# Patient Record
Sex: FEMALE | Employment: FULL TIME | URBAN - METROPOLITAN AREA
[De-identification: names, ages, dates, MRNs, and addresses within clinical notes are randomized per-mention and may not be internally consistent; named-entity substitution may affect disease eponyms.]

---

## 2024-08-10 ENCOUNTER — APPOINTMENT (OUTPATIENT)
Dept: GENERAL RADIOLOGY | Age: 19
End: 2024-08-10
Payer: MEDICAID

## 2024-08-10 ENCOUNTER — APPOINTMENT (OUTPATIENT)
Dept: ULTRASOUND IMAGING | Age: 19
End: 2024-08-10
Payer: MEDICAID

## 2024-08-10 ENCOUNTER — HOSPITAL ENCOUNTER (EMERGENCY)
Age: 19
Discharge: HOME | End: 2024-08-10
Attending: EMERGENCY MEDICINE
Payer: MEDICAID

## 2024-08-10 VITALS
BODY MASS INDEX: 21.66 KG/M2 | HEIGHT: 65 IN | SYSTOLIC BLOOD PRESSURE: 126 MMHG | OXYGEN SATURATION: 98 % | HEART RATE: 68 BPM | WEIGHT: 130 LBS | TEMPERATURE: 97.6 F | RESPIRATION RATE: 15 BRPM | DIASTOLIC BLOOD PRESSURE: 80 MMHG

## 2024-08-10 DIAGNOSIS — R06.02 SHORTNESS OF BREATH: ICD-10-CM

## 2024-08-10 DIAGNOSIS — R10.13 ABDOMINAL PAIN, EPIGASTRIC: Primary | ICD-10-CM

## 2024-08-10 LAB
ALBUMIN SERPL-MCNC: 4 G/DL (ref 3.5–5.2)
ALBUMIN/GLOB SERPL: 2 {RATIO} (ref 1–2.5)
ALP SERPL-CCNC: 100 U/L (ref 35–104)
ALT SERPL-CCNC: 21 U/L (ref 10–35)
ANION GAP SERPL CALCULATED.3IONS-SCNC: 9 MMOL/L (ref 9–16)
AST SERPL-CCNC: 33 U/L (ref 10–35)
BASOPHILS # BLD: <0.03 K/UL (ref 0–0.2)
BASOPHILS NFR BLD: 0 % (ref 0–2)
BILIRUB SERPL-MCNC: 0.4 MG/DL (ref 0–1.2)
BUN SERPL-MCNC: 12 MG/DL (ref 6–20)
CALCIUM SERPL-MCNC: 8.8 MG/DL (ref 8.6–10.4)
CHLORIDE SERPL-SCNC: 110 MMOL/L (ref 98–107)
CO2 SERPL-SCNC: 23 MMOL/L (ref 20–31)
CREAT SERPL-MCNC: 0.6 MG/DL (ref 0.5–0.9)
EOSINOPHIL # BLD: 0.19 K/UL (ref 0–0.44)
EOSINOPHILS RELATIVE PERCENT: 2 % (ref 1–4)
ERYTHROCYTE [DISTWIDTH] IN BLOOD BY AUTOMATED COUNT: 12.5 % (ref 11.8–14.4)
GFR, ESTIMATED: >90 ML/MIN/1.73M2
GLUCOSE SERPL-MCNC: 96 MG/DL (ref 74–99)
HCT VFR BLD AUTO: 39.7 % (ref 36.3–47.1)
HGB BLD-MCNC: 13.4 G/DL (ref 11.9–15.1)
IMM GRANULOCYTES # BLD AUTO: <0.03 K/UL (ref 0–0.3)
IMM GRANULOCYTES NFR BLD: 0 %
LIPASE SERPL-CCNC: 25 U/L (ref 13–60)
LYMPHOCYTES NFR BLD: 3.01 K/UL (ref 1.1–3.7)
LYMPHOCYTES RELATIVE PERCENT: 38 % (ref 25–45)
MCH RBC QN AUTO: 28.3 PG (ref 25.2–33.5)
MCHC RBC AUTO-ENTMCNC: 33.8 G/DL (ref 28.4–34.8)
MCV RBC AUTO: 83.8 FL (ref 82.6–102.9)
MONOCYTES NFR BLD: 0.49 K/UL (ref 0.1–1.4)
MONOCYTES NFR BLD: 6 % (ref 2–8)
NEUTROPHILS NFR BLD: 54 % (ref 34–64)
NEUTS SEG NFR BLD: 4.25 K/UL (ref 1.5–8.1)
NRBC BLD-RTO: 0 PER 100 WBC
PLATELET # BLD AUTO: 244 K/UL (ref 138–453)
PMV BLD AUTO: 9.6 FL (ref 8.1–13.5)
POTASSIUM SERPL-SCNC: 3.9 MMOL/L (ref 3.7–5.3)
PROT SERPL-MCNC: 6.3 G/DL (ref 6.6–8.7)
RBC # BLD AUTO: 4.74 M/UL (ref 3.95–5.11)
SODIUM SERPL-SCNC: 142 MMOL/L (ref 136–145)
TROPONIN I SERPL HS-MCNC: <6 NG/L (ref 0–14)
TROPONIN I SERPL HS-MCNC: <6 NG/L (ref 0–14)
WBC OTHER # BLD: 8 K/UL (ref 4.5–13.5)

## 2024-08-10 PROCEDURE — 71045 X-RAY EXAM CHEST 1 VIEW: CPT

## 2024-08-10 PROCEDURE — 93005 ELECTROCARDIOGRAM TRACING: CPT

## 2024-08-10 PROCEDURE — 80053 COMPREHEN METABOLIC PANEL: CPT

## 2024-08-10 PROCEDURE — 96375 TX/PRO/DX INJ NEW DRUG ADDON: CPT

## 2024-08-10 PROCEDURE — 84484 ASSAY OF TROPONIN QUANT: CPT

## 2024-08-10 PROCEDURE — 6370000000 HC RX 637 (ALT 250 FOR IP)

## 2024-08-10 PROCEDURE — 76705 ECHO EXAM OF ABDOMEN: CPT

## 2024-08-10 PROCEDURE — 99285 EMERGENCY DEPT VISIT HI MDM: CPT

## 2024-08-10 PROCEDURE — 6360000002 HC RX W HCPCS

## 2024-08-10 PROCEDURE — 96374 THER/PROPH/DIAG INJ IV PUSH: CPT

## 2024-08-10 PROCEDURE — 99283 EMERGENCY DEPT VISIT LOW MDM: CPT | Performed by: SURGERY

## 2024-08-10 PROCEDURE — 83690 ASSAY OF LIPASE: CPT

## 2024-08-10 PROCEDURE — 2580000003 HC RX 258

## 2024-08-10 PROCEDURE — 85025 COMPLETE CBC W/AUTO DIFF WBC: CPT

## 2024-08-10 RX ORDER — ONDANSETRON 2 MG/ML
4 INJECTION INTRAMUSCULAR; INTRAVENOUS ONCE
Status: COMPLETED | OUTPATIENT
Start: 2024-08-10 | End: 2024-08-10

## 2024-08-10 RX ORDER — FENTANYL CITRATE 50 UG/ML
50 INJECTION, SOLUTION INTRAMUSCULAR; INTRAVENOUS ONCE
Status: COMPLETED | OUTPATIENT
Start: 2024-08-10 | End: 2024-08-10

## 2024-08-10 RX ORDER — ACETAMINOPHEN 325 MG/1
650 TABLET ORAL ONCE
Status: COMPLETED | OUTPATIENT
Start: 2024-08-10 | End: 2024-08-10

## 2024-08-10 RX ADMIN — FENTANYL CITRATE 50 MCG: 50 INJECTION, SOLUTION INTRAMUSCULAR; INTRAVENOUS at 11:17

## 2024-08-10 RX ADMIN — ONDANSETRON 4 MG: 2 INJECTION INTRAMUSCULAR; INTRAVENOUS at 11:17

## 2024-08-10 RX ADMIN — ACETAMINOPHEN 650 MG: 325 TABLET ORAL at 10:40

## 2024-08-10 RX ADMIN — PANTOPRAZOLE SODIUM 40 MG: 40 INJECTION, POWDER, FOR SOLUTION INTRAVENOUS at 10:40

## 2024-08-10 ASSESSMENT — ENCOUNTER SYMPTOMS
CONSTIPATION: 0
VOMITING: 0
ABDOMINAL PAIN: 1
DIARRHEA: 0
ALLERGIC/IMMUNOLOGIC NEGATIVE: 1
NAUSEA: 1
SHORTNESS OF BREATH: 0
BACK PAIN: 1
SHORTNESS OF BREATH: 1
EYES NEGATIVE: 1
SINUS PRESSURE: 0
BACK PAIN: 0

## 2024-08-10 ASSESSMENT — PAIN SCALES - GENERAL
PAINLEVEL_OUTOF10: 9
PAINLEVEL_OUTOF10: 3

## 2024-08-10 ASSESSMENT — PAIN DESCRIPTION - LOCATION: LOCATION: BACK;CHEST

## 2024-08-10 NOTE — ED NOTES
Pt presents to ED via EMS on stretcher c/o back pain and shortness of breath. Pt reported chest pain when asked as well. Pt reports pain began at 0900 this morning. Pt denies any back injuries. Pt reports only medical history is eclampsia and . Pt appears anxious upon arrival and is grimacing d/t pain. Pt reports some nausea as well, denies emesis. Pt was given 324 ASA PTA by EMS.  Pt spouse at bedside.   Pt is Aox4.  Pt placed on full cardiac monitor, EKG completed, IV access established. Labs drawn.

## 2024-08-10 NOTE — ED PROVIDER NOTES
I performed a history and physical examination of the patient and discussed management with the resident. I reviewed the resident’s note and agree with the documented findings and plan of care. Any areas of disagreement are noted on the chart. I was personally present for the key portions of any procedures. I have documented in the chart those procedures where I was not present during the key portions. Unless noted in my documentation, I agree with the chief complaint, past medical history, past surgical history, allergies, medications, social and family history as documented. Documentation of the HPI, Physical Exam and Medical Decision Making performed by medical students or scribes is based on my personal performance of the HPI, PE and MDM.   For Phys Assistant/ Nurse Practitioner cases/documentation I have personally evaluated this patient and have completed at least one if not all key elements of the E/M (history, physical exam, and MDM). I find the patient's history and physical exam are consistent with the NP/PA documentation. I agree with the care provided, treatment rendered, disposition and followup plan.   Additional findings are as noted.    Flash Noe MD  Attending Emergency  Physician        This patient presented to the emergency department with complaints of being awakened by abdominal discomfort this morning.  There are some associated nausea but no vomiting.  No fevers or chills.  No actual chest pain or difficulty breathing.  No cough.  No diarrhea.  Patient reports the pain is in the mid and right upper abdominal regions and radiates towards the back.  No history of trauma.  No abnormal vaginal bleeding or discharge.  No dysuria, hematuria, frequency, urgency.  Only prior surgery was a  section.  Patient does have an IUD in place.   Awake, alert, cooperative, responsive. Speech fluent, normal comprehension.  Lungs clear bilaterally.  No rales, rhonchi, wheezes, stridor, retractions.

## 2024-08-10 NOTE — ED PROVIDER NOTES
[facility] ED  Emergency Department Encounter  Emergency Medicine Resident     Pt Name:Oleksandr Jarquin  MRN: [id number]  Birthdate 2005  Date of evaluation: 8/10/24  PCP:  Lyle Terry DO  Note Started: 10:17 AM EDT      CHIEF COMPLAINT       Chief Complaint   Patient presents with    Shortness of Breath    Back Pain       HISTORY OF PRESENT ILLNESS  (Location/Symptom, Timing/Onset, Context/Setting, Quality, Duration, Modifying Factors, Severity.)      Oleksandr Jarquin is a 19 y.o. female who presents via EMS with chest pain, shortness of breath, and nausea.  Patient received 1 nitro and 324 ASA and route via EMS. Patient states symptoms started 1 hour PTA.  Patient states she woke up this morning having a difficult time breathing with chest pain that radiated into her back.  Chest pain is described as lower sternal/epigastric that is constant and is exacerbated by laying down and improved by sitting in a more upright position.  Patient states she had roast beef at 0300.  Patient also had  on 2024.  Patient states she occasionally uses THC pen with last use last night.  Patient also ha Mirena IUD placed on 2024 and states she has intermittent spotting.    PAST MEDICAL / SURGICAL / SOCIAL / FAMILY HISTORY      has no past medical history on file.       has no past surgical history on file.      Social History     Socioeconomic History    Marital status: Single     Spouse name: Not on file    Number of children: Not on file    Years of education: Not on file    Highest education level: Not on file   Occupational History    Not on file   Tobacco Use    Smoking status: Not on file    Smokeless tobacco: Not on file   Substance and Sexual Activity    Alcohol use: No    Drug use: No    Sexual activity: Never   Other Topics Concern    Not on file   Social History Narrative    Not on file     Social Determinants of Health     Financial Resource Strain: Not on file   Food  Insecurity: No Food Insecurity (7/22/2024)    Received from [facility]    Hunger Screening     Within the past 12 months we worried whether our food would run out before we got money to buy more.: Never True     Within the past 12 months the food we bought just didn't last and we didn't have money to get more.: Never True   Transportation Needs: Not on file   Physical Activity: Not on file   Stress: Not on file   Social Connections: Not on file   Intimate Partner Violence: Not on file   Housing Stability: Low Risk  (6/26/2024)    Received from [facility]    Housing Instability     Are you worried or concerned that in the next two months you may not have stable housing that you own, rent or stay in as a part of a household?: No       History reviewed. No pertinent family history.    Allergies:  Patient has no known allergies.    Home Medications:  Prior to Admission medications    Not on File         REVIEW OF SYSTEMS       Review of Systems   Constitutional: Negative.    HENT: Negative.     Eyes: Negative.    Respiratory:  Positive for shortness of breath.    Cardiovascular:  Positive for chest pain. Negative for leg swelling.   Gastrointestinal:  Positive for abdominal pain and nausea. Negative for constipation, diarrhea and vomiting.   Endocrine: Negative.    Genitourinary: Negative.    Musculoskeletal:  Positive for back pain.   Skin: Negative.    Allergic/Immunologic: Negative.    Neurological: Negative.    Hematological: Negative.    Psychiatric/Behavioral: Negative.         PHYSICAL EXAM      INITIAL VITALS:   /80   Pulse 68   Temp 97.6 °F (36.4 °C) (Oral)   Resp 15   Ht 1.651 m (5' 5\")   Wt 59 kg (130 lb)   SpO2 98%   BMI 21.63 kg/m²     Physical Exam  Constitutional:       Appearance: Normal appearance. She is well-developed. She is obese.   HENT:      Head: Normocephalic and atraumatic.      Nose: Nose normal.      Mouth/Throat:      Mouth: Mucous membranes are moist.

## 2024-08-10 NOTE — CONSULTS
General Surgery  Consult    PATIENT NAME: Oleksandr Jarquin  AGE: 19 y.o.  MEDICAL RECORD NO. [id number]  DATE: 8/10/2024  SURGEON: Dr. Garcias   PRIMARY CARE PHYSICIAN: Lyle Terry DO    Patient evaluated at the request of  Dr. Noe   Reason for evaluation: Cholelithiasis     Patient information was obtained from patient.  History/Exam limitations: none.    IMPRESSION:     Patient Active Problem List   Diagnosis    Pharyngitis   19 year old female with symptomatic cholelithiasis       PLAN:   Patient seen and examined. Abdominal exam benign. Symptoms improving. Clinical picture consistent with symptomatic chololithiasis. No acute cholecystitis. Discussed with patient follow up outpatient with plan for elective cholecystectomy after PO challenge. She is agreeable to this plan.   Dispo per ED after PO challenge       HISTORY:   History of Chief Complaint:   Oleksandr Jarquin is a 19 y.o. female who presents with epigastric/right upper quadrant pain that began this morning around 9:00.  Patient reports that she ate her dinner around 3 AM which consisted of pot roast.  She then woke up with this pain that radiated to her right back.  Complains of associated nausea.  Denies vomiting denies fever denies chills denies changes in bowel habits.  Past surgical history notable for  section 2 months ago.  Patient denies similar symptoms previously.  She had no issues with biliary colic during her pregnancy.            Past Medical History   has no past medical history on file.  Past Surgical History   has no past surgical history on file.  Medications  Prior to Admission medications    Not on File    Scheduled Meds:  Continuous Infusions:  PRN Meds:.  Allergies  has No Known Allergies.  Family History  family history is not on file.  Social History   has no history on file for tobacco use.   reports no history of alcohol use.   reports no history of drug use.  Review of Systems  Review of Systems   Constitutional:   Negative for chills and fever.   HENT:  Negative for sinus pressure.    Eyes:  Negative for visual disturbance.   Respiratory:  Negative for shortness of breath.    Cardiovascular:  Negative for chest pain.   Gastrointestinal:  Positive for abdominal pain and nausea. Negative for diarrhea and vomiting.   Genitourinary:  Negative for dysuria and hematuria.   Musculoskeletal:  Negative for back pain.   Skin:  Negative for rash.   Neurological:  Negative for light-headedness.         PHYSICAL:   VITALS:  height is 1.651 m (5' 5\") and weight is 59 kg (130 lb). Her oral temperature is 97.6 °F (36.4 °C). Her blood pressure is 126/80 and her pulse is 68. Her respiration is 15 and oxygen saturation is 98%.   Physical Exam  Constitutional:       General: She is not in acute distress.  HENT:      Head: Normocephalic and atraumatic.      Right Ear: External ear normal.      Left Ear: External ear normal.      Nose: Nose normal.      Mouth/Throat:      Mouth: Mucous membranes are moist.   Eyes:      Extraocular Movements: Extraocular movements intact.   Cardiovascular:      Rate and Rhythm: Normal rate.   Pulmonary:      Effort: Pulmonary effort is normal. No respiratory distress.   Abdominal:      General: There is no distension.      Palpations: Abdomen is soft.      Tenderness: There is abdominal tenderness (minimal RUQ tenderness). There is no guarding or rebound.   Musculoskeletal:      Cervical back: Neck supple.   Skin:     General: Skin is warm and dry.   Neurological:      Mental Status: She is alert and oriented to person, place, and time.   Psychiatric:         Mood and Affect: Mood normal.         Behavior: Behavior normal.           LABS:     Recent Labs     08/10/24  1031   WBC 8.0   HGB 13.4   HCT 39.7         K 3.9   *   CO2 23   BUN 12   CREATININE 0.6   CALCIUM 8.8   AST 33   ALT 21   BILITOT 0.4     Recent Labs     08/10/24  1031   ALKPHOS 100   ALT 21   AST 33   BILITOT 0.4   LIPASE 25

## 2024-08-10 NOTE — CONSULTS
General Surgery  Consult    PATIENT NAME: Oleksandr Jarquin  AGE: 19 y.o.  MEDICAL RECORD NO. [id number]  DATE: 8/10/2024  SURGEON: Dieter  PRIMARY CARE PHYSICIAN: Lyle Terry DO    Patient evaluated at the request of  Dr. Noe  Reason for evaluation: RUQ pain, acute cholecystitis    Patient information was obtained from patient.  History/Exam limitations: none.  Patient presented to the Emergency Department by private vehicle.    IMPRESSION:     Patient Active Problem List   Diagnosis    Pharyngitis   Symptomatic cholelithiasis      PLAN:   US with findings consistent with cholelithiasis, labs reassuring. Suspect that this is biliary colic.  No acute surgical intervention required  Discussed with patient elective cholecystectomy, she agrees with this plan.  We will have her follow-up in office within the next week to schedule.      HISTORY:   History of Chief Complaint:    Oleksandr Jarquin is a 19 y.o. female who presents with epigastric/right upper quadrant pain that began this morning around 9:00.  Patient reports that she ate her dinner around 3 AM which consisted of pot roast.  She then woke up with this pain that radiated to her right back.  Complains of associated nausea.  Denies vomiting denies fever denies chills denies changes in bowel habits.  Past surgical history notable for  section 2 months ago.  Patient denies similar symptoms previously.  She had no issues with biliary colic during her pregnancy.       Past Medical History   has no past medical history on file.  Past Surgical History   has no past surgical history on file.  Medications  Prior to Admission medications    Not on File    Scheduled Meds:   pantoprazole (PROTONIX) 40 mg in sodium chloride (PF) 0.9 % 10 mL injection  40 mg IntraVENous Daily     Continuous Infusions:  PRN Meds:.  Allergies  has No Known Allergies.  Family History  family history is not on file.  Social History   has no history on file for tobacco use.   reports

## 2024-08-10 NOTE — DISCHARGE INSTRUCTIONS
You were seen and evaluated for chest pain, shortness of breath.  During our evaluation we noted that you do have a stone in your gallbladder.  You were evaluated by our general surgery team.  This may be symptoms of biliary colic, the recommendations were to have you follow-up at the general surgery clinic as needed or if symptoms do not improve.  You may take Tylenol and as/or ibuprofen as needed for your pain/discomfort.  Please return to the ER for any sudden chest pain, shortness of breath or any other questions or concerns.

## 2024-08-11 LAB
EKG ATRIAL RATE: 84 BPM
EKG P AXIS: 43 DEGREES
EKG P-R INTERVAL: 132 MS
EKG Q-T INTERVAL: 372 MS
EKG QRS DURATION: 82 MS
EKG QTC CALCULATION (BAZETT): 439 MS
EKG R AXIS: 49 DEGREES
EKG T AXIS: 54 DEGREES
EKG VENTRICULAR RATE: 84 BPM